# Patient Record
Sex: FEMALE | Race: WHITE | URBAN - METROPOLITAN AREA
[De-identification: names, ages, dates, MRNs, and addresses within clinical notes are randomized per-mention and may not be internally consistent; named-entity substitution may affect disease eponyms.]

---

## 2021-05-06 ENCOUNTER — APPOINTMENT (OUTPATIENT)
Age: 26
Setting detail: DERMATOLOGY
End: 2021-05-12

## 2021-05-06 DIAGNOSIS — L90.5 SCAR CONDITIONS AND FIBROSIS OF SKIN: ICD-10-CM

## 2021-05-06 DIAGNOSIS — L71.8 OTHER ROSACEA: ICD-10-CM

## 2021-05-06 PROCEDURE — OTHER ORDER TESTS: OTHER

## 2021-05-06 PROCEDURE — 99204 OFFICE O/P NEW MOD 45 MIN: CPT

## 2021-05-06 PROCEDURE — OTHER PRESCRIPTION MEDICATION MANAGEMENT: OTHER

## 2021-05-06 PROCEDURE — OTHER PRESCRIPTION: OTHER

## 2021-05-06 PROCEDURE — OTHER COUNSELING: OTHER

## 2021-05-06 PROCEDURE — OTHER MIPS QUALITY: OTHER

## 2021-05-06 RX ORDER — CLINDAMYCIN PHOSPHATE 10 MG/G
GEL TOPICAL
Qty: 1 | Refills: 0 | Status: ERX | COMMUNITY
Start: 2021-05-06

## 2021-05-06 RX ORDER — DOXYCYCLINE 100 MG/1
CAPSULE ORAL
Qty: 60 | Refills: 1 | Status: ERX | COMMUNITY
Start: 2021-05-06

## 2021-05-06 RX ORDER — SULFACETAMIDE SODIUM AND SULFUR 10; 5 MG/G; MG/G
RINSE TOPICAL
Qty: 1 | Refills: 0 | Status: ERX | COMMUNITY
Start: 2021-05-06

## 2021-05-06 ASSESSMENT — LOCATION ZONE DERM: LOCATION ZONE: FACE

## 2021-05-06 ASSESSMENT — LOCATION DETAILED DESCRIPTION DERM
LOCATION DETAILED: RIGHT CENTRAL MALAR CHEEK
LOCATION DETAILED: LEFT MEDIAL MALAR CHEEK
LOCATION DETAILED: RIGHT INFERIOR CENTRAL MALAR CHEEK
LOCATION DETAILED: SUPERIOR MID FOREHEAD
LOCATION DETAILED: LEFT INFERIOR CENTRAL MALAR CHEEK
LOCATION DETAILED: LEFT MENTAL CREASE
LOCATION DETAILED: INFERIOR MID FOREHEAD
LOCATION DETAILED: RIGHT CHIN

## 2021-05-06 ASSESSMENT — LOCATION SIMPLE DESCRIPTION DERM
LOCATION SIMPLE: RIGHT CHEEK
LOCATION SIMPLE: CHIN
LOCATION SIMPLE: SUPERIOR FOREHEAD
LOCATION SIMPLE: LEFT CHEEK
LOCATION SIMPLE: INFERIOR FOREHEAD

## 2021-05-06 NOTE — PROCEDURE: PRESCRIPTION MEDICATION MANAGEMENT
Plan: Patient was given the option of treating with topical medications +/- oral spironolactone, or with an isotretinoin course first then maintaining with topical medications (and possibly spironolactone). Patient wants to pursue isotretinoin treatment now. She was counseled at length that isotretinoin is not a cure for acne; it is a treatment utilized as a means to \"reset\" oil glands. For an adult female it is unlikely to result in complete, sustained resolution of acne due to continued, cyclical hormonal fluctuations. \\n\\nPatient was given Accutane booklet [#[id number]] and information to read over and bring to her next appointment. She was also given a lab order to complete. She was advised once her baseline labs are received, she will be contacted to schedule her follow up appointment (31 days later) to initiate treatment. Plan: Patient was given the option of treating with topical medications +/- oral spironolactone, or with an isotretinoin course first then maintaining with topical medications (and possibly spironolactone). Patient wants to pursue isotretinoin treatment now. She was counseled at length that isotretinoin is not a cure for acne; it is a treatment utilized as a means to \"reset\" oil glands. For an adult female it is unlikely to result in complete, sustained resolution of acne due to continued, cyclical hormonal fluctuations. \\n\\nPatient was given Accutane booklet [#[id number]] and information to read over and bring to her next appointment. She was also given a lab order to complete. She was advised once her baseline labs are received, she will be contacted to schedule her follow up appointment (31 days later) to initiate treatment.

## 2021-05-06 NOTE — PROCEDURE: ORDER TESTS
Bill For Surgical Tray: no
Expected Date Of Service: 05/06/2021
Clinical Notes (To The Lab): Standing order every 30 days for 6 months
Billing Type: Third-Party Bill

## 2021-05-06 NOTE — PROCEDURE: PRESCRIPTION MEDICATION MANAGEMENT
Initiate Treatment: clindamycin 1 % topical gel BID to face\\n\\nsulfacetamide sodium-sulfur 10-5% topical cleanser daily \\n\\ndoxycycline monohydrate 100 mg capsule BID

## 2021-05-06 NOTE — HPI: PIMPLES (ACNE)
What Type Of Note Output Would You Prefer (Optional)?: Standard Output
How Severe Is Your Acne?: mild
Is This A New Presentation, Or A Follow-Up?: Acne
Additional Comments (Use Complete Sentences): Patient uses Cetaphil moisturizer and cleanser. Further history reveals patient last treated with prescription medication several years ago. She tried an unspecified dose of spironolactone but discontinued due to dizziness. Patient wants to treat with Accutane because she “just wants this taken care of.”